# Patient Record
Sex: FEMALE | Race: WHITE | NOT HISPANIC OR LATINO | Employment: UNEMPLOYED | URBAN - METROPOLITAN AREA
[De-identification: names, ages, dates, MRNs, and addresses within clinical notes are randomized per-mention and may not be internally consistent; named-entity substitution may affect disease eponyms.]

---

## 2020-05-19 ENCOUNTER — APPOINTMENT (OUTPATIENT)
Dept: RADIOLOGY | Facility: CLINIC | Age: 70
End: 2020-05-19
Payer: COMMERCIAL

## 2020-05-19 ENCOUNTER — OFFICE VISIT (OUTPATIENT)
Dept: URGENT CARE | Facility: CLINIC | Age: 70
End: 2020-05-19
Payer: COMMERCIAL

## 2020-05-19 VITALS
TEMPERATURE: 98.9 F | WEIGHT: 107.6 LBS | OXYGEN SATURATION: 99 % | BODY MASS INDEX: 17.93 KG/M2 | HEIGHT: 65 IN | SYSTOLIC BLOOD PRESSURE: 147 MMHG | RESPIRATION RATE: 20 BRPM | HEART RATE: 73 BPM | DIASTOLIC BLOOD PRESSURE: 88 MMHG

## 2020-05-19 DIAGNOSIS — S99.921A TOE INJURY, RIGHT, INITIAL ENCOUNTER: ICD-10-CM

## 2020-05-19 DIAGNOSIS — S92.912A CLOSED NONDISPLACED FRACTURE OF PHALANX OF TOE OF LEFT FOOT, UNSPECIFIED TOE, INITIAL ENCOUNTER: Primary | ICD-10-CM

## 2020-05-19 PROCEDURE — 90471 IMMUNIZATION ADMIN: CPT | Performed by: PHYSICIAN ASSISTANT

## 2020-05-19 PROCEDURE — 73630 X-RAY EXAM OF FOOT: CPT

## 2020-05-19 PROCEDURE — 90715 TDAP VACCINE 7 YRS/> IM: CPT

## 2020-05-19 PROCEDURE — G0382 LEV 3 HOSP TYPE B ED VISIT: HCPCS | Performed by: PHYSICIAN ASSISTANT

## 2022-06-30 ENCOUNTER — APPOINTMENT (OUTPATIENT)
Dept: RADIOLOGY | Facility: CLINIC | Age: 72
End: 2022-06-30
Payer: MEDICARE

## 2022-06-30 ENCOUNTER — OFFICE VISIT (OUTPATIENT)
Dept: URGENT CARE | Facility: CLINIC | Age: 72
End: 2022-06-30
Payer: MEDICARE

## 2022-06-30 VITALS
RESPIRATION RATE: 18 BRPM | DIASTOLIC BLOOD PRESSURE: 103 MMHG | HEIGHT: 60 IN | SYSTOLIC BLOOD PRESSURE: 178 MMHG | WEIGHT: 101 LBS | OXYGEN SATURATION: 98 % | BODY MASS INDEX: 19.83 KG/M2 | HEART RATE: 84 BPM

## 2022-06-30 DIAGNOSIS — R07.81 RIB PAIN ON LEFT SIDE: ICD-10-CM

## 2022-06-30 DIAGNOSIS — S49.91XA INJURY OF RIGHT SHOULDER, INITIAL ENCOUNTER: ICD-10-CM

## 2022-06-30 DIAGNOSIS — M54.6 ACUTE BILATERAL THORACIC BACK PAIN: ICD-10-CM

## 2022-06-30 DIAGNOSIS — R07.81 RIB PAIN ON LEFT SIDE: Primary | ICD-10-CM

## 2022-06-30 PROCEDURE — 71101 X-RAY EXAM UNILAT RIBS/CHEST: CPT

## 2022-06-30 PROCEDURE — G0463 HOSPITAL OUTPT CLINIC VISIT: HCPCS

## 2022-06-30 PROCEDURE — 99213 OFFICE O/P EST LOW 20 MIN: CPT

## 2022-06-30 PROCEDURE — 72072 X-RAY EXAM THORAC SPINE 3VWS: CPT

## 2022-06-30 PROCEDURE — 73030 X-RAY EXAM OF SHOULDER: CPT

## 2022-06-30 RX ORDER — CYCLOBENZAPRINE HCL 5 MG
5 TABLET ORAL 3 TIMES DAILY PRN
Qty: 15 TABLET | Refills: 0 | Status: SHIPPED | OUTPATIENT
Start: 2022-06-30

## 2022-06-30 NOTE — PATIENT INSTRUCTIONS
Preliminary read of radiology report showed no acute fracture or abnormality  If final radiology report differs, you will be notified of their findings  Recommend applying ice, rest, and taking Tylenol or Motrin for pain  Flexeril as needed for muscle spasms  This medication will cause drowsiness, so do not drive after taking, be careful ambulating  May use sling as needed for right arm support  Take arm out of sling often to perform range of motion exercised  If you develop any worsening symptoms as discussed, proceed to ER  Follow-up with PCP

## 2022-06-30 NOTE — PROGRESS NOTES
[address] [facility]        NAME: Ivy Cervantes is a 81 y o  female  : 1950    MRN: [id number]  DATE: 2022  TIME: 7:04 PM    Assessment and Plan   Rib pain on left side [R07 81]  1  Rib pain on left side  XR ribs left w pa chest min 3 views   2  Acute bilateral thoracic back pain  XR spine thoracic 3 vw    cyclobenzaprine (FLEXERIL) 5 mg tablet   3  Injury of right shoulder, initial encounter  XR shoulder 2+ vw right     Discussed my concern with patient and her niece for multiple injuries and trauma  Patient does not want to go to ER for evaluation  She refused ambulance transfer last night for EMS  Multiple x-rays performed and reviewed here  Preliminary read shows no acute fractures  Will contact patient if final radiology report positive for significant findings  Patient placed in sling for right arm support  Advised follow-up with PCP or ortho if her symptoms do not improve  If she develops any new or worsening symptoms, she should proceed to ER for further evaluation  Patient and niece verbalized understanding and agreeable to plan  Patient Instructions     Patient Instructions   Preliminary read of radiology report showed no acute fracture or abnormality  If final radiology report differs, you will be notified of their findings  Recommend applying ice, rest, and taking Tylenol or Motrin for pain  Flexeril as needed for muscle spasms  This medication will cause drowsiness, so do not drive after taking, be careful ambulating  May use sling as needed for right arm support  Take arm out of sling often to perform range of motion exercised  If you develop any worsening symptoms as discussed, proceed to ER  Follow-up with PCP  Follow up with PCP in 3-5 days  Proceed to  ER if symptoms worsen  Chief Complaint     Chief Complaint   Patient presents with    Arm injury     Patient fell through a [employer] last night and injured her right arm           History of Present Illness MISA   Ivy Cervantes is a 81 y o  female who presents today with her daughter for evaluation of multiple injuries following a fall last night  Patient reports she went to sit down on her counter height bar stool last night at home when the seat collapsed and she was stuck in a v-shaped position in the chair for a couple of hours  Patient reports she had to be extricated by fire and EMS  She refused ambulance transfer for hospital evaluation  She reports last night she noticed difficulty using her right arm  She has to use her left arm to move the right arm  She is having right scapula pain, right shoulder and neck numbness, left rib pain, mid thoracic back pain, and bilateral posterior knee pain  She also has some bruising on her right lateral neck, right scapula, left posterior rib area, mid thoracic area, and b/l posterior knees  Patient denies any chest pain, shortness a breath, or difficulty breathing  Patient does not take any medications currently  She denies any headaches, dizziness, lightheadedness, or change in vision  Review of Systems   Review of Systems   Constitutional: Negative for chills and fever  HENT: Negative for ear pain and facial swelling  Eyes: Negative for pain and visual disturbance  Respiratory: Negative for cough, chest tightness and shortness of breath  Cardiovascular: Negative for chest pain and palpitations  Gastrointestinal: Negative for abdominal pain, nausea and vomiting  Genitourinary: Negative for decreased urine volume and hematuria  Musculoskeletal: Positive for arthralgias and back pain  Negative for neck pain  Skin: Positive for color change  Negative for rash  Neurological: Positive for weakness and numbness  Negative for dizziness, facial asymmetry, light-headedness and headaches           Current Medications       Current Outpatient Medications:     cyclobenzaprine (FLEXERIL) 5 mg tablet, Take 1 tablet (5 mg total) by mouth 3 (three) times a day as needed for muscle spasms, Disp: 15 tablet, Rfl: 0    Current Allergies     Allergies as of 06/30/2022 - Reviewed 06/30/2022   Allergen Reaction Noted    Penicillins Hives 05/19/2020            The following portions of the patient's history were reviewed and updated as appropriate: allergies, current medications, past family history, past medical history, past social history, past surgical history and problem list      Past Medical History:   Diagnosis Date    RSD (reflex sympathetic dystrophy)        History reviewed  No pertinent surgical history  History reviewed  No pertinent family history  Medications have been verified  Objective   BP (!) 178/103   Pulse 84   Resp 18   Ht 5' (1 524 m)   Wt 45 8 kg (101 lb)   SpO2 98%   BMI 19 73 kg/m²        Physical Exam     Physical Exam  Vitals and nursing note reviewed  Constitutional:       General: She is not in acute distress  Appearance: Normal appearance  HENT:      Mouth/Throat:      Mouth: Mucous membranes are moist    Eyes:      Extraocular Movements: Extraocular movements intact  Conjunctiva/sclera: Conjunctivae normal       Pupils: Pupils are equal, round, and reactive to light  Neck:     Cardiovascular:      Rate and Rhythm: Normal rate and regular rhythm  Heart sounds: Normal heart sounds, S1 normal and S2 normal    Pulmonary:      Effort: Pulmonary effort is normal       Breath sounds: Normal breath sounds  No wheezing, rhonchi or rales  Abdominal:      General: Abdomen is flat  Bowel sounds are normal       Palpations: Abdomen is soft  Tenderness: There is no abdominal tenderness  Musculoskeletal:      Right shoulder: No swelling or tenderness  Decreased strength  Normal pulse  Cervical back: Full passive range of motion without pain  Thoracic back: Swelling and tenderness present  No bony tenderness  Scoliosis present  Back:       Right knee: Erythema present   Tenderness present  Left knee: Erythema present  Tenderness present  Legs:       Comments: Patient unable to perform AROM of right shoulder and right elbow  Needs to use her left hand to help move the arm  Right wrist and hand ROM and strength normal  Sensation intact, cap refill < 2 seconds, +2 radial pulse  Skin:     General: Skin is warm and dry  Neurological:      Mental Status: She is alert  Psychiatric:         Behavior: Behavior normal  Behavior is cooperative

## 2022-07-01 ENCOUNTER — TELEPHONE (OUTPATIENT)
Dept: URGENT CARE | Facility: CLINIC | Age: 72
End: 2022-07-01

## 2022-07-01 NOTE — TELEPHONE ENCOUNTER
Called and spoke with patient's niece, Xander Blanton to review final radiology findings of shoulder, ribs, and thoracic spine X-rays  Discussed that no acute findings/abnormalities were found  Continue tylenol/motrin and muscle relaxors at home as needed  Recommended patient establish care with a PCP  Niece verbalized understanding and all questions answered  She is going to relay information to the patient

## 2024-09-25 ENCOUNTER — OFFICE VISIT (OUTPATIENT)
Dept: URGENT CARE | Facility: CLINIC | Age: 74
End: 2024-09-25
Payer: MEDICARE

## 2024-09-25 ENCOUNTER — APPOINTMENT (OUTPATIENT)
Dept: RADIOLOGY | Facility: CLINIC | Age: 74
End: 2024-09-25
Payer: MEDICARE

## 2024-09-25 VITALS
HEART RATE: 83 BPM | OXYGEN SATURATION: 96 % | SYSTOLIC BLOOD PRESSURE: 162 MMHG | RESPIRATION RATE: 18 BRPM | BODY MASS INDEX: 19.53 KG/M2 | TEMPERATURE: 98 F | WEIGHT: 100 LBS | DIASTOLIC BLOOD PRESSURE: 93 MMHG
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
## 2024-09-25 DIAGNOSIS — S93.402A SPRAIN OF LEFT ANKLE, UNSPECIFIED LIGAMENT, INITIAL ENCOUNTER: Primary | ICD-10-CM

## 2024-09-25 DIAGNOSIS — M25.572 ACUTE LEFT ANKLE PAIN: ICD-10-CM

## 2024-09-25 PROCEDURE — 73610 X-RAY EXAM OF ANKLE: CPT

## 2024-09-25 PROCEDURE — 99213 OFFICE O/P EST LOW 20 MIN: CPT | Performed by: ORTHOPAEDIC SURGERY

## 2024-09-25 PROCEDURE — G0463 HOSPITAL OUTPT CLINIC VISIT: HCPCS | Performed by: ORTHOPAEDIC SURGERY

## 2024-09-25 NOTE — PATIENT INSTRUCTIONS
Pain/Swelling Control:   - You may take over-the-counter Tylenol/NSAIDs as needed.    - ACE wrap as needed around the foot and ankle for swelling.    - Elevation of the affected lower extremity above the level of your heart   - You may ice the area for up to 20 minutes at a time. Do not place ice directly onto skin.     Weight bearing:   - You may weight bear as tolerated on the affect lower extremity  - Please wear your cam boot or ankle brace at all times with activity. You may remove your cam boot or brace for rest, hygiene, and range of motion exercises.    * Note that if in a cam boot on the right lower extremity you are not permitted to drive any vehicle with the cam boot in place.     Exercises:   - Perform exercises for the foot and ankle multiple times per day, as instructed on the exercise sheet provided.     Follow-up:   - A consult has been placed in your chart for follow-up with orthopedics in 1-2 weeks. Please call the [facility]opedic scheduling office at [phone] to schedule your appointment.     - Proceed to the ED if symptoms worsen

## 2024-09-25 NOTE — PROGRESS NOTES
[facility]        NAME: Nayely Olmstead is a 74 y.o. female  : 1950    MRN: [id number]  DATE: 2024  TIME: 12:18 PM    Assessment and Plan   Sprain of left ankle, unspecified ligament, initial encounter [S93.402A]  1. Sprain of left ankle, unspecified ligament, initial encounter  Ankle Cude ankle/Ankle Brace    Ambulatory Referral to Orthopedic Surgery      2. Fall, initial encounter  XR ankle 3+ vw left      3. Acute left ankle pain  XR ankle 3+ vw left        Left ankle XR reviewed and discussed with the patient, which shows no evidence of fracture or dislocation. No bony lesions.     Patient Instructions     Pain/Swelling Control:   - You may take over-the-counter Tylenol/NSAIDs as needed.    - ACE wrap as needed around the foot and ankle for swelling.    - Elevation of the affected lower extremity above the level of your heart   - You may ice the area for up to 20 minutes at a time. Do not place ice directly onto skin.     Weight bearing:   - You may weight bear as tolerated on the affect lower extremity  - Please wear your cam boot or ankle brace at all times with activity. You may remove your cam boot or brace for rest, hygiene, and range of motion exercises.    * Note that if in a cam boot on the right lower extremity you are not permitted to drive any vehicle with the cam boot in place.     Exercises:   - Perform exercises for the foot and ankle multiple times per day, as instructed on the exercise sheet provided.     Follow-up:   - A consult has been placed in your chart for follow-up with orthopedics in 1-2 weeks. Please call the [facility] Orthopedic scheduling office at [phone] to schedule your appointment.     - Proceed to the ED if symptoms worsen      If tests are performed, our office will contact you with results only if changes need to made to the care plan discussed with you at the visit. [facility]    Chief  Complaint     Chief Complaint   Patient presents with    Ankle Pain     Patient c/o left ankle pain that started after falling getting oob last night, denies hitting head.          History of Present Illness       74-year-old female presents to the urgent care for evaluation of acute left ankle pain.  Patient states that early this morning she got up out of bed to use the bathroom.  While on her way to the bathroom she accidentally tripped and fell.  The patient cannot recall details of the fall as she was still groggy from sleep, but recall she did not hit her head or lose consciousness.  She was able to get up on her own without difficulty or pain and was able to head back to bed.  It was not until she was lying in bed, however, that she noticed pain in the right ankle she complains of the pain worsens with weightbearing and turning the ankle.  When asked where the pain was located she points along the lateral aspect.  There is swelling present.  The patient denies any numbness or tingling.  She denies any pertinent past medical history with regards to the ankle.  She has been ambulating with a wheeled walker since the incident this morning due to the pain.  Patient denies pain anywhere else, including the upper extremities, back, neck.        Review of Systems   Review of Systems   Constitutional:  Negative for chills and fever.   HENT:  Negative for ear pain and sore throat.    Eyes:  Negative for pain and visual disturbance.   Respiratory:  Negative for cough and shortness of breath.    Cardiovascular:  Negative for chest pain and palpitations.   Gastrointestinal:  Negative for abdominal pain, diarrhea, nausea and vomiting.   Genitourinary:  Negative for dysuria and hematuria.   Musculoskeletal:  Positive for arthralgias, gait problem and joint swelling. Negative for back pain.   Skin:  Negative for color change and rash.   Neurological:  Negative for seizures and syncope.   All other systems reviewed and are  negative.        Current Medications       Current Outpatient Medications:     cyclobenzaprine (FLEXERIL) 5 mg tablet, Take 1 tablet (5 mg total) by mouth 3 (three) times a day as needed for muscle spasms (Patient not taking: Reported on 9/25/2024), Disp: 15 tablet, Rfl: 0    Current Allergies     Allergies as of 09/25/2024 - Reviewed 09/25/2024   Allergen Reaction Noted    Penicillins Hives 05/19/2020            The following portions of the patient's history were reviewed and updated as appropriate: allergies, current medications, past family history, past medical history, past social history, past surgical history and problem list.     Past Medical History:   Diagnosis Date    RSD (reflex sympathetic dystrophy)        History reviewed. No pertinent surgical history.    No family history on file.      Medications have been verified.        Objective   /93   Pulse 83   Temp 98 °F (36.7 °C) (Temporal)   Resp 18   Wt 45.4 kg (100 lb)   SpO2 96%   BMI 19.53 kg/m²        Physical Exam     Physical Exam  Vitals and nursing note reviewed.   Constitutional:       General: She is not in acute distress.     Appearance: Normal appearance. She is not ill-appearing.   HENT:      Head: Normocephalic and atraumatic.      Nose: Nose normal.      Mouth/Throat:      Mouth: Mucous membranes are moist.      Pharynx: Oropharynx is clear.   Eyes:      Extraocular Movements: Extraocular movements intact.      Pupils: Pupils are equal, round, and reactive to light.   Cardiovascular:      Rate and Rhythm: Normal rate and regular rhythm.      Pulses: Normal pulses.   Pulmonary:      Effort: Pulmonary effort is normal. No respiratory distress.   Musculoskeletal:      Cervical back: Normal range of motion.      Comments: Left ankle:  Patient ambulating with a 4 point walker, slowly with an antalgic gait.  There is some soft tissue swelling noted laterally.  Tenderness to palpation along the CFL.  No tenderness along the PTFL,  ATFL.  No bony tenderness laterally or medially.  Patient has full active range of motion of the ankle, though complains of pain with dorsiflexion and plantarflexion.  Negative anterior drawer.  Negative syndesmotic compression test.  Sensation tact light touch distally.  Soft lower extremity compartments.  Strong pedal pulse.  Brisk cap refill in all toes.   Skin:     General: Skin is warm and dry.      Capillary Refill: Capillary refill takes less than 2 seconds.   Neurological:      General: No focal deficit present.      Mental Status: She is alert and oriented to person, place, and time.   Psychiatric:         Mood and Affect: Mood normal.         Behavior: Behavior normal.